# Patient Record
Sex: MALE | Race: WHITE | NOT HISPANIC OR LATINO | Employment: FULL TIME | URBAN - NONMETROPOLITAN AREA
[De-identification: names, ages, dates, MRNs, and addresses within clinical notes are randomized per-mention and may not be internally consistent; named-entity substitution may affect disease eponyms.]

---

## 2024-04-20 ENCOUNTER — HOSPITAL ENCOUNTER (EMERGENCY)
Facility: HOSPITAL | Age: 34
Discharge: HOME | End: 2024-04-20
Attending: EMERGENCY MEDICINE
Payer: COMMERCIAL

## 2024-04-20 VITALS
HEART RATE: 95 BPM | RESPIRATION RATE: 16 BRPM | WEIGHT: 185 LBS | OXYGEN SATURATION: 96 % | HEIGHT: 72 IN | TEMPERATURE: 98.1 F | DIASTOLIC BLOOD PRESSURE: 90 MMHG | SYSTOLIC BLOOD PRESSURE: 136 MMHG | BODY MASS INDEX: 25.06 KG/M2

## 2024-04-20 DIAGNOSIS — S81.012A LACERATION OF LEFT KNEE, INITIAL ENCOUNTER: Primary | ICD-10-CM

## 2024-04-20 PROCEDURE — 99283 EMERGENCY DEPT VISIT LOW MDM: CPT

## 2024-04-20 PROCEDURE — 25010000002 LIDOCAINE 1 % SOLUTION

## 2024-04-20 RX ORDER — DOXYCYCLINE 100 MG/1
100 CAPSULE ORAL 2 TIMES DAILY
Qty: 14 CAPSULE | Refills: 0 | Status: SHIPPED | OUTPATIENT
Start: 2024-04-20 | End: 2024-04-27

## 2024-04-20 RX ORDER — LIDOCAINE HYDROCHLORIDE 10 MG/ML
10 INJECTION, SOLUTION INFILTRATION; PERINEURAL ONCE
Status: COMPLETED | OUTPATIENT
Start: 2024-04-20 | End: 2024-04-20

## 2024-04-20 RX ORDER — DOXYCYCLINE 100 MG/1
100 CAPSULE ORAL ONCE
Status: COMPLETED | OUTPATIENT
Start: 2024-04-20 | End: 2024-04-20

## 2024-04-20 RX ADMIN — DOXYCYCLINE 100 MG: 100 CAPSULE ORAL at 15:41

## 2024-04-20 RX ADMIN — LIDOCAINE HYDROCHLORIDE 10 ML: 10 INJECTION, SOLUTION INFILTRATION; PERINEURAL at 14:52

## 2024-04-20 NOTE — Clinical Note
[facility] EMERGENCY DEPARTMENT  [address] [address]  Phone: [phone]    Domenic Matthews was seen and treated in our emergency department on 4/20/2024.  He may return to work on 04/27/2024.         Thank you for choosing [facility].    Mark Frazier PA-C

## 2024-04-20 NOTE — DISCHARGE INSTRUCTIONS
Follow-up with orthopedics.  The numbers been attached.  Call to schedule a follow-up appointment.  Watch for signs or symptoms of wound infection as we discussed.  Wear the knee immobilizer to keep your knee straight to prevent the stitches from popping out.  Recommend not bending your knee until the sutures have been removed and the wound is healed.  I have sent antibiotics, make sure to pick these up and take as directed.  If you start to notice any signs of redness, purulent discharge, or fevers, please return for reevaluation.

## 2024-04-20 NOTE — ED PROVIDER NOTES
"Subjective  History of Present Illness:    This is a 34-year male seen today for evaluation of left knee laceration.  [place] it and accidentally struck his knee.  No bony tenderness.  Has a 1 cm laceration to the superior lateral aspect of the left knee.  Bleeding controlled.  Appears to be a very clean wound.  Happened around 11-12 last night.  Patient has been ambulatory has full range of motion.  Up-to-date on tetanus.      Nurses Notes reviewed and agree, including vitals, allergies, social history and prior medical history.     REVIEW OF SYSTEMS: All systems reviewed and not pertinent unless noted.  Review of Systems   Skin:         Knee laceration   All other systems reviewed and are negative.      History reviewed. No pertinent past medical history.    Allergies:    Sulfa antibiotics      No past surgical history on file.      Social History     Socioeconomic History    Marital status: Single         History reviewed. No pertinent family history.    Objective  Physical Exam:  /90 (BP Location: Left arm, Patient Position: Sitting)   Pulse 95   Temp 98.1 °F (36.7 °C) (Oral)   Resp 16   Ht 182.9 cm (72\")   Wt 83.9 kg (185 lb)   SpO2 96%   BMI 25.09 kg/m²      Physical Exam  Vitals and nursing note reviewed.           CONSTITUTIONAL: Well developed, alert and orient x 4,  in no acute distress.  VITAL SIGNS: per nursing, reviewed and noted  SKIN: exposed skin with no rashes, ulcerations or petechiae.  1 cm laceration noted to the superior lateral aspect of the left knee, no obvious bone exposed.  Pulses intact.  Nonbleeding.  EYES: Grossly EOMI, no icterus  ENT: Normal voice.   RESPIRATORY:  No increased work of breathing. No retractions.   CARDIOVASCULAR:   Good Peripheral pulses. Good cap refill to extremities.   GI: Abdomen soft, flat.  MUSCULOSKELETAL: Age-appropriate bulk and tone, moves all 4 extremities  NEUROLOGIC: Alert, oriented " x 3. No gross deficits. GCS 15.   PSYCH: appropriate affect.  : no bladder tenderness or distention, no CVA tenderness    Laceration Repair    Date/Time: 4/20/2024 3:49 PM    Performed by: Mark Frazier PA-C  Authorized by: Flip Garber DO    Consent:     Consent obtained:  Verbal    Consent given by:  Patient    Risks, benefits, and alternatives were discussed: yes      Risks discussed:  Infection, pain, poor cosmetic result, tendon damage and need for additional repair    Alternatives discussed:  Referral and delayed treatment  Universal protocol:     Procedure explained and questions answered to patient or proxy's satisfaction: yes      Patient identity confirmed:  Verbally with patient  Anesthesia:     Anesthesia method:  Local infiltration    Local anesthetic:  Lidocaine 1% w/o epi  Laceration details:     Location:  Leg    Leg location:  L knee    Length (cm):  1  Exploration:     Wound exploration: wound explored through full range of motion and entire depth of wound visualized      Wound extent: no fascia violation noted, no foreign bodies/material noted, no muscle damage noted, no nerve damage noted, no tendon damage noted, no underlying fracture noted and no vascular damage noted    Treatment:     Area cleansed with:  Chlorhexidine    Amount of cleaning:  Extensive    Irrigation solution:  Sterile water    Irrigation volume:  1000cc    Irrigation method:  Pressure wash    Visualized foreign bodies/material removed: no      Debridement:  None    Undermining:  None    Scar revision: no      Layers repaired: dermal.  Skin repair:     Repair method:  Sutures    Suture size:  3-0    Suture material:  Nylon    Suture technique:  Simple interrupted    Number of sutures:  5  Approximation:     Approximation:  Close  Repair type:     Repair type:  Simple  Post-procedure details:     Dressing:  Non-adherent dressing    Procedure completion:  Tolerated well, no immediate complications      ED Course:          Lab Results (last 24 hours)       ** No results found for the last 24 hours. **             No radiology results from the last 24 hrs       MDM      Initial impression of presenting illness: This is a 34-year-old male presenting today for evaluation of laceration to left knee.    DDX: includes but is not limited to: Laceration abrasion contusion tendon ligament injury others    Patient arrives hemodynamically stable afebrile nontachycardic nonhypoxic nontoxic-appearing with vitals interpreted by myself.     Pertinent features from physical exam: Patient has a 1 cm gaping laceration on the superior lateral aspect of the left knee.  Full range of motion.  Pulses intact.  Wound appears clean.  No obvious foreign bodies.    Initial diagnostic plan: No labs or imaging necessary at this time.  Low suspicion for underlying fracture, no bony tenderness, full range of motion.  Pulses intact.    Results from initial plan were reviewed and interpreted by me revealing N/A    Diagnostic information from other sources: Record reviewed    Interventions / Re-evaluation: Extensive conversation was had about risk of closure, patient elects to proceed with closure.  It has been a few hours past normal closure time, however wound appears very clean, and patient requesting closure.  Will place on doxycycline and irrigate wound extensively prior to closure.  Cleansed with chlorhexidine, lidocaine 1% without epinephrine used for local anesthetic.  Closed with nylon suture.  Placed in knee immobilizer to prevent dehiscence of suture given area of tension. Total of 5 3-0 nylon sutures placed.     Results/clinical rationale were discussed with patient at bedside    Consultations/Discussion of results with other physicians: N/A    Disposition plan: discharged.  Placed on doxycycline.  Will have him follow-up with orthopedics.  Return precautions given.  -----    Final diagnoses:   Laceration of left knee, initial encounter          Freddie  Mark KAY PA-C  04/20/24 1551